# Patient Record
Sex: MALE | Race: WHITE | Employment: FULL TIME | URBAN - NONMETROPOLITAN AREA
[De-identification: names, ages, dates, MRNs, and addresses within clinical notes are randomized per-mention and may not be internally consistent; named-entity substitution may affect disease eponyms.]

---

## 2022-08-01 ENCOUNTER — HOSPITAL ENCOUNTER (EMERGENCY)
Age: 62
Discharge: HOME | End: 2022-08-01
Attending: STUDENT IN AN ORGANIZED HEALTH CARE EDUCATION/TRAINING PROGRAM
Payer: COMMERCIAL

## 2022-08-01 VITALS
RESPIRATION RATE: 18 BRPM | HEIGHT: 68 IN | TEMPERATURE: 98 F | WEIGHT: 228 LBS | HEART RATE: 70 BPM | SYSTOLIC BLOOD PRESSURE: 138 MMHG | BODY MASS INDEX: 34.56 KG/M2 | DIASTOLIC BLOOD PRESSURE: 92 MMHG | OXYGEN SATURATION: 98 %

## 2022-08-01 DIAGNOSIS — S01.01XA LACERATION OF SCALP, INITIAL ENCOUNTER: Primary | ICD-10-CM

## 2022-08-01 PROCEDURE — 99282 EMERGENCY DEPT VISIT SF MDM: CPT

## 2022-08-01 PROCEDURE — 12001 RPR S/N/AX/GEN/TRNK 2.5CM/<: CPT

## 2022-08-01 RX ORDER — CITALOPRAM 40 MG/1
40 TABLET ORAL DAILY
COMMUNITY

## 2022-08-01 RX ORDER — CITALOPRAM 40 MG/1
40 TABLET ORAL DAILY
COMMUNITY
Start: 2022-03-29 | End: 2022-08-01

## 2022-08-01 NOTE — ED NOTES
AVS provided and reviewed with the patient. The patient verbalized understanding of care at home, follow up care, and emergent symptoms to return for. No questions or concerns verbalized at this time. The patient is alert, oriented, stable, and ambulatory out of the department at the time of discharge.        Carrington Barillas RN  08/01/22 2326

## 2022-08-01 NOTE — ED PROVIDER NOTES
[address] COMPLAINT  Head Laceration (Patient states that he bent over this AM around 0630 to pet his dog when his ankle rolled causing him to fall and hit his head on a table. Pt. Denies LOC. )     HISTORY OF PRESENT ILLNESS  Angela Jose is a 65 y.o. male  who presents to the ED complaining of head laceration. Patient states that this morning, he was petting his dog and he bent over and he has a bad right ankle which gave out on him and caused him to fall. He states that he hit his head on a table. He denies loss of consciousness. He does not take any blood thinners. He states his tetanus has been updated within the past 2 to 3 years. He denies any other injuries or any other complaints or concerns. Denies headache. No other complaints, modifying factors or associated symptoms. I have reviewed the following from the nursing documentation. History reviewed. No pertinent past medical history. History reviewed. No pertinent surgical history. History reviewed. No pertinent family history.   Social History     Socioeconomic History    Marital status: Single     Spouse name: Not on file    Number of children: Not on file    Years of education: Not on file    Highest education level: Not on file   Occupational History    Not on file   Tobacco Use    Smoking status: Former     Types: Cigars    Smokeless tobacco: Never   Substance and Sexual Activity    Alcohol use: No    Drug use: No    Sexual activity: Yes     Partners: Female   Other Topics Concern    Not on file   Social History Narrative    Not on file     Social Determinants of Health     Financial Resource Strain: Not on file   Food Insecurity: Not on file   Transportation Needs: Not on file   Physical Activity: Not on file   Stress: Not on file   Social Connections: Not on file   Intimate Partner Violence: Not on file   Housing Stability: Not on file     No current facility-administered medications for this encounter. Current Outpatient Medications   Medication Sig Dispense Refill    citalopram (CELEXA) 40 MG tablet Take 40 mg by mouth in the morning. EPINEPHrine (EPIPEN 2-MARY) 0.3 MG/0.3ML SOAJ injection Use as directed for allergic reaction (Patient not taking: Reported on 8/1/2022) 1 each 1     Allergies   Allergen Reactions    Other Anaphylaxis, Shortness Of Breath and Swelling     BEE STINGS       REVIEW OF SYSTEMS  10 systems reviewed, pertinent positives per HPI otherwise noted to be negative. PHYSICAL EXAM  BP (!) 138/92   Pulse 70   Temp 98 °F (36.7 °C) (Oral)   Resp 18   Ht 5' 8\" (1.727 m)   Wt 228 lb (103.4 kg)   SpO2 98%   BMI 34.67 kg/m²    GENERAL APPEARANCE: Awake and alert. Cooperative. No acute distress. HENT: Normocephalic. U-shaped laceration to the R parietal scalp, bleeding controlled. NECK: Supple. Nontender. EYES: PERRL. EOM's grossly intact. HEART/CHEST: RRR. No murmurs. LUNGS: Respirations unlabored. CTAB. Good air exchange. Speaking comfortably in full sentences. ABDOMEN: No tenderness. Soft. Non-distended. No masses. No organomegaly. No guarding or rebound. MUSCULOSKELETAL: No extremity edema. Compartments soft. No deformity. No tenderness in the extremities. All extremities neurovascularly intact. SKIN: Warm and dry. No acute rashes. NEUROLOGICAL: Alert and oriented. CN's 2-12 intact. No gross facial drooping. Strength 5/5, sensation intact. Gait normal.  PSYCHIATRIC: Normal mood and affect. LABS  I have reviewed all labs for this visit. No results found for this visit on 08/01/22. RADIOLOGY  No orders to display     ED COURSE/MDM  Patient seen and evaluated. Old records reviewed. Labs and imaging reviewed and results discussed with patient. Patient is a 20-year-old male, presenting with concerns for laceration to the scalp. This occurred after mechanical fall earlier today. Full HPI as detailed above.   Upon arrival in the ED, vitals reassuring. Patient is resting comfortably and is in no acute distress. He is not anticoagulated. He denies loss of consciousness. Denies neck pain. Denies syncopal or presyncopal symptoms preceding his fall. He does abuse that shaped laceration on the right parietal scalp as detailed above. It was repaired using staples here in the department after it was copiously cleansed. Patient is given return precautions to the ED. He is comfortable in agreement with plan of care, was given timing for staple removal.  Given wound care instructions. He is comfortable in agreement with plan of care was discharged. Procedure Note - Laceration repair:  Questions were sought and answered and verbal consent was given by patient for the procedure. The area was prepped in the standard fashion. The wound area was not anesthetized. The wound was explored with No foreign bodies found. The wound was repaired with staples 10 staples were used. The patient tolerated the procedure well without complications and my repeat neurovascular exam post-procedure is unchanged. Wound care and scar minimization education was provided. Instructions were given to return for increasing pain, redness, streaking, discharge, or any other worsening or worrisome concerns. I, Dr. Ethel Contreras MD, am the primary clinician of record. Is this patient to be included in the SEP-1 Core Measure? No   Exclusion criteria - the patient is NOT to be included for SEP-1 Core Measure due to: Infection is not suspected     During the patient's ED course, the patient was given:  Medications - No data to display     CLINICAL IMPRESSION  1. Laceration of scalp, initial encounter        Blood pressure (!) 138/92, pulse 70, temperature 98 °F (36.7 °C), temperature source Oral, resp. rate 18, height 5' 8\" (1.727 m), weight 228 lb (103.4 kg), SpO2 98 %. DISPOSITION  Bharath Terry was discharged to home in good condition.     Patient was given scripts for the following medications. I counseled patient how to take these medications. Discharge Medication List as of 8/1/2022  8:19 AM          Follow-up with:  Diogo Valentine MD  [address] [place], [place] [address]  [address] [facility]  [phone]    Schedule an appointment as soon as possible for a visit       [place] [id number]. [facility]  [address]  [phone]  Go to   If symptoms worsen    DISCLAIMER: This chart was created using Dragon dictation software. Efforts were made by me to ensure accuracy, however some errors may be present due to limitations of this technology and occasionally words are not transcribed correctly.        Jacinto Eduardo MD  08/01/22 0626